# Patient Record
Sex: MALE | Race: WHITE | Employment: FULL TIME | URBAN - METROPOLITAN AREA
[De-identification: names, ages, dates, MRNs, and addresses within clinical notes are randomized per-mention and may not be internally consistent; named-entity substitution may affect disease eponyms.]

---

## 2017-11-29 ENCOUNTER — HOSPITAL ENCOUNTER (OUTPATIENT)
Age: 62
Discharge: HOME | End: 2017-11-29
Attending: PEDIATRICS
Payer: COMMERCIAL

## 2017-11-29 VITALS
BODY MASS INDEX: 25.41 KG/M2 | RESPIRATION RATE: 18 BRPM | SYSTOLIC BLOOD PRESSURE: 176 MMHG | TEMPERATURE: 97 F | HEIGHT: 74 IN | OXYGEN SATURATION: 99 % | WEIGHT: 198 LBS | HEART RATE: 72 BPM | DIASTOLIC BLOOD PRESSURE: 100 MMHG

## 2017-11-29 DIAGNOSIS — R03.0 ELEVATED BP WITHOUT DIAGNOSIS OF HYPERTENSION: ICD-10-CM

## 2017-11-29 DIAGNOSIS — H43.391 FLOATERS IN VISUAL FIELD, RIGHT: Primary | ICD-10-CM

## 2017-11-29 PROCEDURE — 99202 OFFICE O/P NEW SF 15 MIN: CPT

## 2017-11-29 RX ORDER — ASPIRIN 81 MG/1
81 TABLET, CHEWABLE ORAL DAILY
COMMUNITY

## 2017-11-29 NOTE — ED NOTES
Pt discharged and sent to Dr. Miguel Pelletier office for a [phone] appointment. Pt given directions to get to office. Pt verbalized understanding of going to appointment.

## 2017-11-29 NOTE — ED PROVIDER NOTES
Patient Seen in: [address]    History   Patient presents with:   Eye Visual Problem (opthalmic)    Stated Complaint: floating spots in eyes    HPI    Pt complains of right eye floaters since yesterday afternoon at about 4 PERRLA  Ant chambers: nl inspection  ENT:  mmm, no lesions  Neck: supple, no LAD  Skin: warm and dry, no rash, no laceration          Disposition and Plan     Clinical Impression:  Floaters in visual field, right  (primary encounter diagnosis)  Elevated BP